# Patient Record
(demographics unavailable — no encounter records)

---

## 2024-10-08 NOTE — HISTORY OF PRESENT ILLNESS
[de-identified] : Patient with worsening asthma - coughing up yellow mucus - he started prednisone 40 mg QD - medication from 2017 - no improvement in his symptoms.

## 2024-10-08 NOTE — PHYSICAL EXAM
[Alert] : alert [No Acute Distress] : no acute distress [No Neck Mass] : no neck mass was observed [Wheezing] : wheezing was heard [Normal Rate] : heart rate was normal  [Normal S1, S2] : normal S1 and S2 [Regular Rhythm] : with a regular rhythm [Normal Cervical Lymph Nodes] : cervical [de-identified] : coughing [de-identified] : coarse BS bilaterally

## 2024-10-08 NOTE — ASSESSMENT
[FreeTextEntry1] : Acute exacerbation of asthma/COPD secondary to bronchial infection:  Prednisone 30 mg BID Continue Duoneb QID  Continue budesonide BID  Continue Advair HFA BID  Augmentin 875 mg BID  CXR today Call in 2 days .

## 2024-10-15 NOTE — HISTORY OF PRESENT ILLNESS
[de-identified] : Patient is presently treated with Augmentin BID - prednisone 40 mg - mucus is pale yellow - left nostril is clogged on the left side - better with azelastine.   Able to sleep 6 hours last night

## 2024-10-15 NOTE — ASSESSMENT
[FreeTextEntry1] : Acute exacerbation of asthma/COPD secondary to bronchial infection:  Prednisone 40 mg QD x 3 D Prednisone 30 mg QD x 4 D Continue Duoneb QID  Continue budesonide BID  Continue Advair HFA BID  Augmentin 875 mg BID   Call in 1 week  .

## 2024-10-15 NOTE — PHYSICAL EXAM
[Alert] : alert [Well Nourished] : well nourished [No Acute Distress] : no acute distress [Well Developed] : well developed [No Neck Mass] : no neck mass was observed [No LAD] : no lymphadenopathy [Normal Rate] : heart rate was normal  [Normal S1, S2] : normal S1 and S2 [Regular Rhythm] : with a regular rhythm [Normal Cervical Lymph Nodes] : cervical [Skin Intact] : skin intact  [No Rash] : no rash [Normal Mood] : mood was normal [Normal Affect] : affect was normal [Judgment and Insight Age Appropriate] : judgement and insight is age appropriate [de-identified] : left ear normal.

## 2024-10-29 NOTE — ASSESSMENT
[FreeTextEntry1] : Acute exacerbation of asthma/COPD secondary to bronchial infection:  Prednisone 10 mg QD x 7 D Complete course of Doxycycline 100 mg BID  Continue Duoneb QID  Continue budesonide BID  Continue Advair HFA BID  Restart Fasenra next week    .

## 2024-10-29 NOTE — PHYSICAL EXAM
[Alert] : alert [Well Nourished] : well nourished [No Acute Distress] : no acute distress [Well Developed] : well developed [No Neck Mass] : no neck mass was observed [No LAD] : no lymphadenopathy [Normal Rate and Effort] : normal respiratory rhythm and effort [No Crackles] : no crackles [No Retractions] : no retractions [Wheezing] : no wheezing was heard [Normal Rate] : heart rate was normal  [Normal S1, S2] : normal S1 and S2 [Regular Rhythm] : with a regular rhythm [Normal Cervical Lymph Nodes] : cervical [Skin Intact] : skin intact  [No Rash] : no rash [Normal Mood] : mood was normal [Normal Affect] : affect was normal [Judgment and Insight Age Appropriate] : judgement and insight is age appropriate [de-identified] : left ear normal.

## 2024-10-29 NOTE — HISTORY OF PRESENT ILLNESS
[de-identified] : Much improved - less coughing - using Nasacort and taking prednisone 20 mg QD now

## 2024-11-18 NOTE — PHYSICAL EXAM
[Alert] : alert [Well Nourished] : well nourished [No Acute Distress] : no acute distress [Well Developed] : well developed [No Neck Mass] : no neck mass was observed [No LAD] : no lymphadenopathy [Wheezing] : wheezing was heard [Normal Rate] : heart rate was normal  [Normal S1, S2] : normal S1 and S2 [Regular Rhythm] : with a regular rhythm [Normal Mood] : mood was normal [Judgment and Insight Age Appropriate] : judgement and insight is age appropriate [de-identified] : wheezing much improved after coughing

## 2024-11-18 NOTE — ASSESSMENT
[FreeTextEntry1] : Acute exacerbation of asthma/COPD secondary to bronchial infection:  Prednisone 40 mg QD x 3 D Prednisone 30 mg QD x 4 D Prednisone 20 mg QD x 4 D Continue Duoneb QID  Continue budesonide BID  Continue Advair HFA BID  Doxycycline 100 mg BID   Call in 1 week  .

## 2025-04-29 NOTE — ASSESSMENT
[FreeTextEntry1] : Acute exacerbation of moderate persistent asthma secondary to bronchitis/sinusitis:  Prednisone 40 mg QD x 5 D Prednisone 30 mg QD x 3 D Augmentin 875 mg BID x 14 days Duoneb QID  Continue Advair HFA  Continue Singulair RV 1 week 
Yes

## 2025-04-29 NOTE — PHYSICAL EXAM
[Alert] : alert [Well Nourished] : well nourished [No Acute Distress] : no acute distress [Well Developed] : well developed [Normal Nasal Mucosa] : the nasal mucosa was normal [No Neck Mass] : no neck mass was observed [No LAD] : no lymphadenopathy [Wheezing] : wheezing was heard [Normal Rate] : heart rate was normal  [Normal S1, S2] : normal S1 and S2 [Regular Rhythm] : with a regular rhythm [Normal Mood] : mood was normal [Judgment and Insight Age Appropriate] : judgement and insight is age appropriate [de-identified] : coarse exp wheeze and scattered rhonchi

## 2025-04-29 NOTE — HISTORY OF PRESENT ILLNESS
[de-identified] : Patient is treated with Advair  230 HFA - Singulair - Fasenra - Duoneb - he reports worsening of his asthma after URI - now with yellow/green mucus plugs - he took prednisone 40 mg last night - problems sleeping at night.

## 2025-05-06 NOTE — PHYSICAL EXAM
[Alert] : alert [Well Nourished] : well nourished [No Acute Distress] : no acute distress [Well Developed] : well developed [Normal Nasal Mucosa] : the nasal mucosa was normal [No Neck Mass] : no neck mass was observed [No LAD] : no lymphadenopathy [Wheezing] : wheezing was heard [Normal Rate] : heart rate was normal  [Normal S1, S2] : normal S1 and S2 [Regular Rhythm] : with a regular rhythm [Normal Mood] : mood was normal [Judgment and Insight Age Appropriate] : judgement and insight is age appropriate [de-identified] : coarse exp wheeze and scattered rhonchi - improved from last visit

## 2025-05-06 NOTE — ASSESSMENT
[FreeTextEntry1] : Acute exacerbation of moderate persistent asthma secondary to bronchitis/sinusitis:  Prednisone 30 mg QD x 5 D Prednisone 20 mg QD x 3 D Augmentin 875 mg BID x 14 days Duoneb QID  Continue Advair HFA  Continue Singulair RV 1 week

## 2025-05-06 NOTE — HISTORY OF PRESENT ILLNESS
[de-identified] : Patient presently taking prednisone 20 mg QD - Augmentin BID - Advair HFA - Singulair - Duoneb - he reports mucus breaking up and not as discolored but not back to himself yet

## 2025-05-13 NOTE — HISTORY OF PRESENT ILLNESS
[de-identified] : Patient is taking prednisone 20 mg QD - Augmentin completed antibiotics today - Advair HFA - Singulair and he is presently using his nebulizer TID - going to [employer] graduation over the weekend

## 2025-05-13 NOTE — PHYSICAL EXAM
[Alert] : alert [Well Nourished] : well nourished [No Acute Distress] : no acute distress [Well Developed] : well developed [Normal Nasal Mucosa] : the nasal mucosa was normal [No Neck Mass] : no neck mass was observed [No LAD] : no lymphadenopathy [Wheezing] : no wheezing was heard [Normal Rate] : heart rate was normal  [Normal S1, S2] : normal S1 and S2 [Regular Rhythm] : with a regular rhythm [Normal Mood] : mood was normal [Judgment and Insight Age Appropriate] : judgement and insight is age appropriate

## 2025-05-13 NOTE — ASSESSMENT
[FreeTextEntry1] : Acute exacerbation of moderate persistent asthma secondary to bronchitis/sinusitis:  Prednisone 10 mg QD x 5 D Augmentin 875 mg BID finishes today  Duoneb QID  Continue Advair HFA  Continue Singulair Resume Fasenra